# Patient Record
(demographics unavailable — no encounter records)

---

## 2025-01-14 NOTE — ASSESSMENT
[FreeTextEntry1] : 17-year-old male here accompanied by his mother who presents valuation status post right thumb injury.  Patient reports he was skiing a few days ago when he fell and landed directly on his right hand and thumb.  Pain over the base of the first metacarpal since time of injury is overall improving and is well-controlled.  Denies any instability.  Denies any numbness or tingling.  Physical examination of the right thumb: Mild swelling appreciated over the 1st MP joint.  No ecchymosis or erythema appreciated.  Skin is intact.  Patient mildly tender to palpation along the MP joint.  Can fully flex and extend at all joints in the thumb.  Can lift the thumb off a flat examination table.  No instability appreciated upon radial or ulnar deviation.  Good strength throughout.  Sensorimotor intact distally.  Neuro vas intact.  No significant tenderness at the metacarpal or scaphoid/anatomic snuffbox.  X-rays of the right thumb taken in the office today:  No acute fractures, subluxations, or dislocations..no  Treatment plan as discussed:  My clinical suspicion is high for contusion of the right right given the patient's history, physical examination findings, and x-ray findings. Patient understands that bone contusions can take 4 to 6 weeks to fully heal, and that the first 2 weeks were always the worst in terms of pain and swelling.  I am not concerned for ligamentous tear as there is no instability on examination.  I recommended anti-inflammatory medication. Patient agrees to taking Advil/Ibuprofen OTC as needed for pain. Benefits discussed. Confirmed no contraindication to NSAIDs.  I recommended patient rest, ice, compress, and elevate the finger regularly. Encouraged activity modification as tolerable. Encouraged gentle range of motion to avoid stiffness.  All questions and concerns addressed to patient's satisfaction. Patient expresses full understanding of treatment plan. Patient will follow up in 3 weeks for repeat evaluation and treatment.
1